# Patient Record
Sex: MALE | Race: WHITE
[De-identification: names, ages, dates, MRNs, and addresses within clinical notes are randomized per-mention and may not be internally consistent; named-entity substitution may affect disease eponyms.]

---

## 2018-04-04 ENCOUNTER — HOSPITAL ENCOUNTER (EMERGENCY)
Dept: HOSPITAL 46 - ED | Age: 53
Discharge: HOME | End: 2018-04-04
Payer: COMMERCIAL

## 2018-04-04 VITALS — HEIGHT: 70 IN | BODY MASS INDEX: 25.77 KG/M2 | WEIGHT: 180.01 LBS

## 2018-04-04 DIAGNOSIS — M79.671: Primary | ICD-10-CM

## 2018-04-04 DIAGNOSIS — M79.672: ICD-10-CM

## 2018-04-04 DIAGNOSIS — G89.29: ICD-10-CM

## 2020-10-02 NOTE — XMS
PreManage Notification: CHEMO LEONARDO MRN:[id number]
 
Security Information
 
Security Events
No recent Security Events currently on file
 
 
 
CRITERIA MET
------------
- [facility] - 2 Visits in 30 Days
 
 
CARE PROVIDERS
There are no care providers on record at this time.
 
Darnell has no Care Guidelines for this patient.
 
[facility] (12 MO.)
-------------------------------------------------------------------------------------
[facility]-----
T[facility]
-------------------------------------------------------------------------------------
NOTE: Visits indicate total known visits.
 
ED/C VISIT TRACKING (12 MO.)
-------------------------------------------------------------------------------------
10/02/2020 17:42
[facility] [place]
 
TYPE: Emergency
 
COMPLAINT:
- MEDICAL CLEARANCE
-------------------------------------------------------------------------------------
09/30/2020 16:05
[facility] [place]
 
TYPE: Emergency
 
COMPLAINT:
- MEDICAL CLEARANCE
-------------------------------------------------------------------------------------
 
 
INPATIENT VISIT TRACKING (12 MO.)
No inpatient visits to display in this time frame
 
[email or web address]

## 2021-10-03 NOTE — EKG
[facility]
                                    [address] [place]  [address]
_________________________________________________________________________________________
                                                                 Signed   
 
 
Normal sinus rhythm
Moderate voltage criteria for LVH, may be normal variant
Borderline ECG
When compared with ECG of 25-JUN-2020 09:02,
Vent. rate has increased BY  30 BPM
Confirmed by JEREMIAH VILLALOBOS MD ([id number]) on 10/3/2021 6:57:20 PM
 
 
 
 
 
 
 
 
 
 
 
 
 
 
 
 
 
 
 
 
 
 
 
 
 
 
 
 
 
 
 
 
 
 
 
 
 
 
 
    Electronically Signed By: JEREMIAH VILLALOBOS MD  10/03/21 1857
_________________________________________________________________________________________
PATIENT NAME:     ADEOLAPAULCHEMO                        
MEDICAL RECORD #: [id number]                     Electrocardiogram             
          ACCT #: [id number]  
DATE OF BIRTH:   [birth year]                                       
PHYSICIAN:   JEREMIAH VILLALOBOS MD           REPORT #: [id number]
REPORT IS CONFIDENTIAL AND NOT TO BE RELEASED WITHOUT AUTHORIZATION

## 2021-10-03 NOTE — XMS
PreManage Notification: CHEMO LEONARDO MRN:[id number]
 
Security Information
 
Security Events
No recent Security Events currently on file
 
 
 
CRITERIA MET
------------
- [facility] - 2 Visits in 30 Days
 
 
CARE PROVIDERS
-------------------------------------------------------------------------------------
Jacinto Napier DO     Family Medicine     Current
 
PHONE: [phone]
-------------------------------------------------------------------------------------
 
Darnell has no Care Guidelines for this patient.
 
MICHELLE VISIT COUNT (12 MO.)
-------------------------------------------------------------------------------------
2 [facility]
-------------------------------------------------------------------------------------
TOTAL 2
-------------------------------------------------------------------------------------
NOTE: Visits indicate total known visits.
 
ED/UCC VISIT TRACKING (12 MO.)
-------------------------------------------------------------------------------------
10/03/2021 12:49
[facility] [place]
 
TYPE: Emergency
 
COMPLAINT:
- MEDICAL CLEARANCE
-------------------------------------------------------------------------------------
09/28/2021 15:20
[facility] [place]
 
TYPE: Emergency
 
COMPLAINT:
- MEDICAL CLEARANCE - LWOBS
-------------------------------------------------------------------------------------
 
 
INPATIENT VISIT TRACKING (12 MO.)
No inpatient visits to display in this time frame
 
[email or web address]